# Patient Record
Sex: MALE | Race: WHITE | Employment: FULL TIME | URBAN - METROPOLITAN AREA
[De-identification: names, ages, dates, MRNs, and addresses within clinical notes are randomized per-mention and may not be internally consistent; named-entity substitution may affect disease eponyms.]

---

## 2017-12-15 PROBLEM — Z72.0 TOBACCO ABUSE: Status: ACTIVE | Noted: 2017-12-15

## 2017-12-15 PROBLEM — I25.10 CORONARY ARTERY DISEASE INVOLVING NATIVE CORONARY ARTERY OF NATIVE HEART WITHOUT ANGINA PECTORIS: Status: ACTIVE | Noted: 2017-12-15

## 2017-12-15 PROBLEM — E78.2 MIXED HYPERLIPIDEMIA: Status: ACTIVE | Noted: 2017-12-15

## 2017-12-15 PROBLEM — I10 HTN (HYPERTENSION), BENIGN: Status: ACTIVE | Noted: 2017-12-15

## 2019-05-31 PROBLEM — M79.605 LEG PAIN, BILATERAL: Status: ACTIVE | Noted: 2019-05-31

## 2019-05-31 PROBLEM — M79.604 LEG PAIN, BILATERAL: Status: ACTIVE | Noted: 2019-05-31

## 2020-04-17 PROBLEM — K63.5 POLYP OF COLON, UNSPECIFIED PART OF COLON, UNSPECIFIED TYPE: Status: ACTIVE | Noted: 2020-04-17

## 2020-04-17 PROBLEM — M79.604 LEG PAIN, BILATERAL: Status: RESOLVED | Noted: 2019-05-31 | Resolved: 2020-04-17

## 2020-04-17 PROBLEM — M79.605 LEG PAIN, BILATERAL: Status: RESOLVED | Noted: 2019-05-31 | Resolved: 2020-04-17

## 2021-10-20 PROBLEM — E78.2 MIXED HYPERLIPIDEMIA: Status: RESOLVED | Noted: 2017-12-15 | Resolved: 2021-10-20

## 2024-11-21 ENCOUNTER — HOSPITAL ENCOUNTER (EMERGENCY)
Age: 49
Discharge: HOME | End: 2024-11-21
Attending: EMERGENCY MEDICINE
Payer: COMMERCIAL

## 2024-11-21 ENCOUNTER — APPOINTMENT (OUTPATIENT)
Dept: GENERAL RADIOLOGY | Age: 49
End: 2024-11-21
Attending: EMERGENCY MEDICINE
Payer: COMMERCIAL

## 2024-11-21 VITALS
TEMPERATURE: 98 F | RESPIRATION RATE: 20 BRPM | HEIGHT: 72 IN | OXYGEN SATURATION: 97 % | BODY MASS INDEX: 42.66 KG/M2 | HEART RATE: 74 BPM | WEIGHT: 315 LBS | DIASTOLIC BLOOD PRESSURE: 78 MMHG | SYSTOLIC BLOOD PRESSURE: 145 MMHG

## 2024-11-21 DIAGNOSIS — R07.89 ACUTE CHEST WALL PAIN: Primary | ICD-10-CM

## 2024-11-21 LAB
ALBUMIN SERPL-MCNC: 4.9 G/DL (ref 3.2–4.8)
ALBUMIN/GLOB SERPL: 1.8 {RATIO} (ref 1–2)
ALP LIVER SERPL-CCNC: 63 U/L
ALT SERPL-CCNC: 23 U/L
ANION GAP SERPL CALC-SCNC: 8 MMOL/L (ref 0–18)
AST SERPL-CCNC: 24 U/L (ref ?–34)
BASOPHILS # BLD AUTO: 0.07 X10(3) UL (ref 0–0.2)
BASOPHILS NFR BLD AUTO: 0.8 %
BILIRUB SERPL-MCNC: 0.7 MG/DL (ref 0.3–1.2)
BUN BLD-MCNC: 13 MG/DL (ref 9–23)
BUN BLD-MCNC: 14 MG/DL (ref 7–18)
CALCIUM BLD-MCNC: 10 MG/DL (ref 8.7–10.4)
CHLORIDE BLD-SCNC: 101 MMOL/L (ref 98–112)
CHLORIDE SERPL-SCNC: 104 MMOL/L (ref 98–112)
CO2 BLD-SCNC: 26 MMOL/L (ref 21–32)
CO2 SERPL-SCNC: 27 MMOL/L (ref 21–32)
CREAT BLD-MCNC: 0.9 MG/DL
CREAT BLD-MCNC: 0.96 MG/DL
D DIMER PPP FEU-MCNC: <0.27 UG/ML FEU (ref ?–0.5)
EGFRCR SERPLBLD CKD-EPI 2021: 105 ML/MIN/1.73M2 (ref 60–?)
EGFRCR SERPLBLD CKD-EPI 2021: 98 ML/MIN/1.73M2 (ref 60–?)
EOSINOPHIL # BLD AUTO: 0.31 X10(3) UL (ref 0–0.7)
EOSINOPHIL NFR BLD AUTO: 3.6 %
ERYTHROCYTE [DISTWIDTH] IN BLOOD BY AUTOMATED COUNT: 14.4 %
GLOBULIN PLAS-MCNC: 2.8 G/DL (ref 2–3.5)
GLUCOSE BLD-MCNC: 136 MG/DL (ref 70–99)
GLUCOSE BLD-MCNC: 141 MG/DL (ref 70–99)
HCT VFR BLD AUTO: 47 %
HCT VFR BLD CALC: 47 %
HGB BLD-MCNC: 16.2 G/DL
IMM GRANULOCYTES # BLD AUTO: 0.02 X10(3) UL (ref 0–1)
IMM GRANULOCYTES NFR BLD: 0.2 %
ISTAT IONIZED CALCIUM FOR CHEM 8: 1.23 MMOL/L (ref 1.12–1.32)
LYMPHOCYTES # BLD AUTO: 2.17 X10(3) UL (ref 1–4)
LYMPHOCYTES NFR BLD AUTO: 25.5 %
MCH RBC QN AUTO: 29.7 PG (ref 26–34)
MCHC RBC AUTO-ENTMCNC: 34.5 G/DL (ref 31–37)
MCV RBC AUTO: 86.1 FL
MONOCYTES # BLD AUTO: 0.49 X10(3) UL (ref 0.1–1)
MONOCYTES NFR BLD AUTO: 5.8 %
NEUTROPHILS # BLD AUTO: 5.44 X10 (3) UL (ref 1.5–7.7)
NEUTROPHILS # BLD AUTO: 5.44 X10(3) UL (ref 1.5–7.7)
NEUTROPHILS NFR BLD AUTO: 64.1 %
OSMOLALITY SERPL CALC.SUM OF ELEC: 290 MOSM/KG (ref 275–295)
PLATELET # BLD AUTO: 241 10(3)UL (ref 150–450)
POTASSIUM BLD-SCNC: 3.8 MMOL/L (ref 3.6–5.1)
POTASSIUM SERPL-SCNC: 3.9 MMOL/L (ref 3.5–5.1)
PROT SERPL-MCNC: 7.7 G/DL (ref 5.7–8.2)
RBC # BLD AUTO: 5.46 X10(6)UL
SODIUM BLD-SCNC: 139 MMOL/L (ref 136–145)
SODIUM SERPL-SCNC: 139 MMOL/L (ref 136–145)
TROPONIN I BLD-MCNC: <0.02 NG/ML
TROPONIN I SERPL HS-MCNC: <3 NG/L
WBC # BLD AUTO: 8.5 X10(3) UL (ref 4–11)

## 2024-11-21 PROCEDURE — 85025 COMPLETE CBC W/AUTO DIFF WBC: CPT | Performed by: EMERGENCY MEDICINE

## 2024-11-21 PROCEDURE — 36415 COLL VENOUS BLD VENIPUNCTURE: CPT

## 2024-11-21 PROCEDURE — 84484 ASSAY OF TROPONIN QUANT: CPT | Performed by: EMERGENCY MEDICINE

## 2024-11-21 PROCEDURE — 80053 COMPREHEN METABOLIC PANEL: CPT | Performed by: EMERGENCY MEDICINE

## 2024-11-21 PROCEDURE — 80047 BASIC METABLC PNL IONIZED CA: CPT

## 2024-11-21 PROCEDURE — 93005 ELECTROCARDIOGRAM TRACING: CPT

## 2024-11-21 PROCEDURE — 84484 ASSAY OF TROPONIN QUANT: CPT

## 2024-11-21 PROCEDURE — 99284 EMERGENCY DEPT VISIT MOD MDM: CPT

## 2024-11-21 PROCEDURE — 99285 EMERGENCY DEPT VISIT HI MDM: CPT

## 2024-11-21 PROCEDURE — 71045 X-RAY EXAM CHEST 1 VIEW: CPT | Performed by: EMERGENCY MEDICINE

## 2024-11-21 PROCEDURE — 85379 FIBRIN DEGRADATION QUANT: CPT | Performed by: EMERGENCY MEDICINE

## 2024-11-21 PROCEDURE — 93010 ELECTROCARDIOGRAM REPORT: CPT

## 2024-11-21 RX ORDER — NAPROXEN 500 MG/1
500 TABLET ORAL 2 TIMES DAILY PRN
Qty: 20 TABLET | Refills: 0 | Status: SHIPPED | OUTPATIENT
Start: 2024-11-21

## 2024-11-21 RX ORDER — CYCLOBENZAPRINE HCL 10 MG
10 TABLET ORAL 3 TIMES DAILY PRN
Qty: 20 TABLET | Refills: 0 | Status: SHIPPED | OUTPATIENT
Start: 2024-11-21 | End: 2024-11-28

## 2024-11-21 NOTE — ED INITIAL ASSESSMENT (HPI)
Pt c/o right sided chest/back pain x 3-4 days.  Worse with movement and is re-producible. Denies cough, shortness of breath.

## 2024-11-21 NOTE — ED PROVIDER NOTES
Patient Seen in: [facility] In [place]      History     Chief Complaint   Patient presents with    Back Pain    Chest Pain Angina     Stated Complaint: right back and chest pain that is intermittent for past 3-4 days    Subjective:   HPI      49-year-old male presents to the emergency department with complaints of somewhere between a 3 and 5-day history of right sided chest pain.  He describes as an intermittent sharp stabbing pain.  He states it occurred after he was clearing his throat of mucus.  He states it feels more chest wall but he was concerned because he has had a myocardial infarction about 7 to 10 years ago and had 3 stents placed.  He states he is on aspirin and Effient and has been compliant with his medications.  No DVT or PE history.  He is a type II diabetic.  No significant cough cold or congestion.  No fevers or chills.  No other acute complaints    Objective:     Past Medical History:    Anxiety    Atherosclerosis of coronary artery    Coronary atherosclerosis of native coronary artery    Heart attack (HCC)    High blood pressure    High cholesterol    Hyperlipidemia    Obesity    Sleep apnea    ST elevation MI (STEMI) ([facility])    anterior wall    Tobacco abuse              Past Surgical History:   Procedure Laterality Date    Angioplasty (coronary)  12/28/2015    Successful PTCA of the mid to distal circumflex with a thrombectomy, dilated and stented from 100% Down to 0% ISAK 3 flow, no dissection. Mid LAD was dilated and stented from 80% down to 0 ISAK 3 flow, no dissection,                 Social History     Socioeconomic History    Marital status:    Tobacco Use    Smoking status: Former     Types: Cigarettes    Smokeless tobacco: Never   Vaping Use    Vaping status: Never Used   Substance and Sexual Activity    Alcohol use: Yes     Comment: rarely    Drug use: No                  Physical Exam     ED Triage Vitals [11/21/24 1556]   /70   Pulse 94   Resp 20    Temp 97.7 °F (36.5 °C)   Temp src Oral   SpO2 100 %   O2 Device None (Room air)       Current Vitals:   Vital Signs  BP: 145/78  Pulse: 74  Resp: 20  Temp: 97.7 °F (36.5 °C)  Temp src: Oral    Oxygen Therapy  SpO2: 97 %  O2 Device: None (Room air)        Physical Exam  Vitals and nursing note reviewed.   Constitutional:       General: He is not in acute distress.     Appearance: Normal appearance. He is well-developed. He is obese.   HENT:      Head: Normocephalic and atraumatic.   Cardiovascular:      Rate and Rhythm: Normal rate and regular rhythm.      Pulses: Normal pulses.      Heart sounds: Normal heart sounds.   Pulmonary:      Effort: Pulmonary effort is normal.      Breath sounds: Normal breath sounds. No stridor.      Comments: Reproducible chest wall tenderness in second intercostal space  Chest:      Chest wall: Tenderness present.   Abdominal:      General: Bowel sounds are normal.      Palpations: Abdomen is soft.   Musculoskeletal:         General: Normal range of motion.      Cervical back: Normal range of motion and neck supple.   Lymphadenopathy:      Cervical: No cervical adenopathy.   Skin:     General: Skin is warm and dry.   Neurological:      General: No focal deficit present.      Mental Status: He is alert and oriented to person, place, and time.            ED Course     Labs Reviewed   COMP METABOLIC PANEL (14) - Abnormal; Notable for the following components:       Result Value    Glucose 136 (*)     Albumin 4.9 (*)     All other components within normal limits   POCT ISTAT CHEM8 CARTRIDGE - Abnormal; Notable for the following components:    ISTAT Glucose 141 (*)     All other components within normal limits   D-DIMER - Normal   TROPONIN I HIGH SENSITIVITY - Normal   ISTAT TROPONIN - Normal   CBC WITH DIFFERENTIAL WITH PLATELET   RAINBOW DRAW LAVENDER   RAINBOW DRAW LIGHT GREEN   RAINBOW DRAW BLUE     EKG    Rate, intervals and axes as noted on EKG Report.  Rate: 104  Rhythm: Sinus  Rhythm  Reading: Occasional PVCs but no acute ST segment elevation                XR CHEST AP PORTABLE  (CPT=71045)    Result Date: 11/21/2024            PROCEDURE:  XR CHEST AP PORTABLE  (CPT=71045)  TECHNIQUE:  AP chest radiograph was obtained.  COMPARISON:  None.  INDICATIONS:  right back and chest pain that is intermittent for past 3-4 days  PATIENT STATED HISTORY: (As transcribed by Technologist)  Right side chest pain for past 4 to 6 days.   FINDINGS: Cardiac silhouette is unremarkable.  There is mild apically redistribution of pulmonary vasculature. No consolidation, pleural effusion or pneumothorax. IMPRESSION: No consolidation.  Minimal pulmonary vascular congestion.   LOCATION:  [place] [id number]      Dictated by (CST): West Daley MD on 11/21/2024 at 4:36 PM     Finalized by (CST): West Daley MD on 11/21/2024 at 4:37 PM            MDM      Patient had an IV established and blood work obtained.  He was placed on a monitor.  Although the EKG captured some PVCs patient is not having frequent PVCs while I am in the room with him.  Patient had a chest x-ray I personally reviewed the films not appreciate any pneumothorax or obvious pleural effusion reviewed radiology report they felt that he had perhaps some increased redistribution of his pulmonary vasculature but patient has no signs of fluid overload by exam his O2 saturations are in the upper 90s.  Blood work was overall unremarkable he had a negative D-dimer and negative troponin.  Patient has right sided chest pain with an unremarkable EKG and negative workup with reproducible pain on palpation of feel this is most consistent with musculoskeletal especially since it occurred after coughing.  We discussed care and treatment of musculoskeletal/chest wall strain and he was discharged in good condition        Medical Decision Making      Disposition and Plan     Clinical Impression:  1. Acute chest wall pain         Disposition:  Discharge  11/21/2024   5:40 pm    Follow-up:  Preston Liz MD  [address] [facility] [address]  [phone]    Schedule an appointment as soon as possible for a visit            Medications Prescribed:  Discharge Medication List as of 11/21/2024  5:51 PM        START taking these medications    Details   naproxen 500 MG Oral Tab Take 1 tablet (500 mg total) by mouth 2 (two) times daily as needed., Normal, Disp-20 tablet, R-0      cyclobenzaprine 10 MG Oral Tab Take 1 tablet (10 mg total) by mouth 3 (three) times daily as needed for Muscle spasms., Normal, Disp-20 tablet, R-0                 Supplementary Documentation:

## 2024-11-22 LAB
ATRIAL RATE: 104 BPM
P AXIS: 63 DEGREES
P-R INTERVAL: 174 MS
Q-T INTERVAL: 346 MS
QRS DURATION: 92 MS
QTC CALCULATION (BEZET): 454 MS
R AXIS: 48 DEGREES
T AXIS: 29 DEGREES
VENTRICULAR RATE: 104 BPM